# Patient Record
Sex: MALE | URBAN - METROPOLITAN AREA
[De-identification: names, ages, dates, MRNs, and addresses within clinical notes are randomized per-mention and may not be internally consistent; named-entity substitution may affect disease eponyms.]

---

## 2020-05-19 ENCOUNTER — TELEPHONE (OUTPATIENT)
Dept: FAMILY MEDICINE | Facility: CLINIC | Age: 60
End: 2020-05-19

## 2020-05-19 NOTE — TELEPHONE ENCOUNTER
Does patient have a PCP here at [place]?   We need more history on this patient.   Please have him schedule a Tele visit. Thanks

## 2020-05-19 NOTE — TELEPHONE ENCOUNTER
Pt requesting a letter for employer ([employer]) wife tested positive for COVID 5/24/20; her symptoms began 5/12/20.  Patient tested negative himself and is not symptomatic but would like letter to state his wife's positive test and clear him for work until her quarantine period is over.     Please advise.